# Patient Record
Sex: MALE | Race: WHITE | Employment: OTHER | URBAN - METROPOLITAN AREA
[De-identification: names, ages, dates, MRNs, and addresses within clinical notes are randomized per-mention and may not be internally consistent; named-entity substitution may affect disease eponyms.]

---

## 2024-02-03 ENCOUNTER — APPOINTMENT (OUTPATIENT)
Dept: MRI IMAGING | Age: 89
DRG: 439 | End: 2024-02-03
Attending: STUDENT IN AN ORGANIZED HEALTH CARE EDUCATION/TRAINING PROGRAM
Payer: MEDICARE

## 2024-02-03 ENCOUNTER — HOSPITAL ENCOUNTER (INPATIENT)
Age: 89
LOS: 4 days | Discharge: HOME | DRG: 439 | End: 2024-02-07
Attending: STUDENT IN AN ORGANIZED HEALTH CARE EDUCATION/TRAINING PROGRAM | Admitting: INTERNAL MEDICINE
Payer: MEDICARE

## 2024-02-03 ENCOUNTER — APPOINTMENT (OUTPATIENT)
Dept: GENERAL RADIOLOGY | Age: 89
DRG: 439 | End: 2024-02-03
Attending: STUDENT IN AN ORGANIZED HEALTH CARE EDUCATION/TRAINING PROGRAM
Payer: MEDICARE

## 2024-02-03 PROBLEM — R10.9 ABDOMINAL PAIN: Status: ACTIVE | Noted: 2024-02-03

## 2024-02-03 PROBLEM — K85.10 GALLSTONE PANCREATITIS: Status: ACTIVE | Noted: 2024-02-03

## 2024-02-03 LAB
ALBUMIN SERPL-MCNC: 3.4 G/DL (ref 3.4–5)
ALBUMIN/GLOB SERPL: 1.2 {RATIO} (ref 1.1–2.2)
ALP SERPL-CCNC: 169 U/L (ref 40–129)
ALT SERPL-CCNC: 319 U/L (ref 10–40)
ANION GAP SERPL CALCULATED.3IONS-SCNC: 15 MMOL/L (ref 3–16)
ANISOCYTOSIS BLD QL SMEAR: ABNORMAL
AST SERPL-CCNC: 257 U/L (ref 15–37)
BASOPHILS # BLD: 0 K/UL (ref 0–0.2)
BASOPHILS NFR BLD: 0 %
BILIRUB DIRECT SERPL-MCNC: 2.9 MG/DL (ref 0–0.3)
BILIRUB INDIRECT SERPL-MCNC: 0.4 MG/DL (ref 0–1)
BILIRUB SERPL-MCNC: 3.3 MG/DL (ref 0–1)
BUN SERPL-MCNC: 43 MG/DL (ref 7–20)
CALCIUM SERPL-MCNC: 8.8 MG/DL (ref 8.3–10.6)
CHLORIDE SERPL-SCNC: 104 MMOL/L (ref 99–110)
CO2 SERPL-SCNC: 19 MMOL/L (ref 21–32)
CREAT SERPL-MCNC: 1.7 MG/DL (ref 0.8–1.3)
DEPRECATED RDW RBC AUTO: 14 % (ref 12.4–15.4)
DOHLE BOD BLD QL SMEAR: PRESENT
EOSINOPHIL # BLD: 0 K/UL (ref 0–0.6)
EOSINOPHIL NFR BLD: 0 %
GFR SERPLBLD CREATININE-BSD FMLA CKD-EPI: 37 ML/MIN/{1.73_M2}
GLUCOSE BLD-MCNC: 169 MG/DL (ref 70–99)
GLUCOSE SERPL-MCNC: 222 MG/DL (ref 70–99)
HCT VFR BLD AUTO: 32.6 % (ref 40.5–52.5)
HGB BLD-MCNC: 10.7 G/DL (ref 13.5–17.5)
LACTATE BLDV-SCNC: 2.3 MMOL/L (ref 0.4–2)
LIPASE SERPL-CCNC: 1083 U/L (ref 13–60)
LYMPHOCYTES # BLD: 1.3 K/UL (ref 1–5.1)
LYMPHOCYTES NFR BLD: 6 %
MACROCYTES BLD QL SMEAR: ABNORMAL
MCH RBC QN AUTO: 29.2 PG (ref 26–34)
MCHC RBC AUTO-ENTMCNC: 32.9 G/DL (ref 31–36)
MCV RBC AUTO: 88.7 FL (ref 80–100)
MICROCYTES BLD QL SMEAR: ABNORMAL
MONOCYTES # BLD: 0.8 K/UL (ref 0–1.3)
MONOCYTES NFR BLD: 5 %
NEUTROPHILS # BLD: 14.6 K/UL (ref 1.7–7.7)
NEUTROPHILS NFR BLD: 55 %
NEUTS BAND NFR BLD MANUAL: 32 % (ref 0–7)
NEUTS VAC BLD QL SMEAR: PRESENT
PERFORMED ON: ABNORMAL
PLATELET # BLD AUTO: 162 K/UL (ref 135–450)
PLATELET BLD QL SMEAR: ADEQUATE
PMV BLD AUTO: 9.1 FL (ref 5–10.5)
POIKILOCYTOSIS BLD QL SMEAR: ABNORMAL
POLYCHROMASIA BLD QL SMEAR: ABNORMAL
POTASSIUM SERPL-SCNC: 4 MMOL/L (ref 3.5–5.1)
PROT SERPL-MCNC: 6.3 G/DL (ref 6.4–8.2)
RBC # BLD AUTO: 3.67 M/UL (ref 4.2–5.9)
SLIDE REVIEW: ABNORMAL
SODIUM SERPL-SCNC: 138 MMOL/L (ref 136–145)
VARIANT LYMPHS NFR BLD MANUAL: 2 % (ref 0–6)
WBC # BLD AUTO: 16.8 K/UL (ref 4–11)

## 2024-02-03 PROCEDURE — 83690 ASSAY OF LIPASE: CPT

## 2024-02-03 PROCEDURE — 1200000000 HC SEMI PRIVATE

## 2024-02-03 PROCEDURE — 2580000003 HC RX 258: Performed by: NURSE PRACTITIONER

## 2024-02-03 PROCEDURE — 80053 COMPREHEN METABOLIC PANEL: CPT

## 2024-02-03 PROCEDURE — 36415 COLL VENOUS BLD VENIPUNCTURE: CPT

## 2024-02-03 PROCEDURE — 85025 COMPLETE CBC W/AUTO DIFF WBC: CPT

## 2024-02-03 PROCEDURE — 74018 RADEX ABDOMEN 1 VIEW: CPT

## 2024-02-03 PROCEDURE — 6360000002 HC RX W HCPCS: Performed by: NURSE PRACTITIONER

## 2024-02-03 PROCEDURE — 83605 ASSAY OF LACTIC ACID: CPT

## 2024-02-03 PROCEDURE — 2580000003 HC RX 258: Performed by: INTERNAL MEDICINE

## 2024-02-03 PROCEDURE — 6360000002 HC RX W HCPCS: Performed by: INTERNAL MEDICINE

## 2024-02-03 PROCEDURE — 82248 BILIRUBIN DIRECT: CPT

## 2024-02-03 PROCEDURE — 74181 MRI ABDOMEN W/O CONTRAST: CPT

## 2024-02-03 RX ORDER — ATORVASTATIN CALCIUM 40 MG/1
40 TABLET, FILM COATED ORAL DAILY
COMMUNITY

## 2024-02-03 RX ORDER — SODIUM CHLORIDE 9 MG/ML
INJECTION, SOLUTION INTRAVENOUS PRN
Status: DISCONTINUED | OUTPATIENT
Start: 2024-02-03 | End: 2024-02-07 | Stop reason: HOSPADM

## 2024-02-03 RX ORDER — CLOPIDOGREL BISULFATE 75 MG/1
75 TABLET ORAL DAILY
Status: DISCONTINUED | OUTPATIENT
Start: 2024-02-03 | End: 2024-02-07 | Stop reason: HOSPADM

## 2024-02-03 RX ORDER — SODIUM CHLORIDE 9 MG/ML
INJECTION, SOLUTION INTRAVENOUS CONTINUOUS
Status: ACTIVE | OUTPATIENT
Start: 2024-02-03 | End: 2024-02-04

## 2024-02-03 RX ORDER — SODIUM CHLORIDE 0.9 % (FLUSH) 0.9 %
5-40 SYRINGE (ML) INJECTION PRN
Status: DISCONTINUED | OUTPATIENT
Start: 2024-02-03 | End: 2024-02-07 | Stop reason: HOSPADM

## 2024-02-03 RX ORDER — ONDANSETRON 2 MG/ML
4 INJECTION INTRAMUSCULAR; INTRAVENOUS EVERY 6 HOURS PRN
Status: DISCONTINUED | OUTPATIENT
Start: 2024-02-03 | End: 2024-02-07 | Stop reason: HOSPADM

## 2024-02-03 RX ORDER — NITROGLYCERIN 0.4 MG/1
0.4 TABLET SUBLINGUAL EVERY 5 MIN PRN
COMMUNITY

## 2024-02-03 RX ORDER — MAGNESIUM SULFATE IN WATER 40 MG/ML
2000 INJECTION, SOLUTION INTRAVENOUS PRN
Status: DISCONTINUED | OUTPATIENT
Start: 2024-02-03 | End: 2024-02-03

## 2024-02-03 RX ORDER — ACETAMINOPHEN 650 MG/1
650 SUPPOSITORY RECTAL EVERY 6 HOURS PRN
Status: DISCONTINUED | OUTPATIENT
Start: 2024-02-03 | End: 2024-02-07 | Stop reason: HOSPADM

## 2024-02-03 RX ORDER — ENOXAPARIN SODIUM 100 MG/ML
30 INJECTION SUBCUTANEOUS DAILY
Status: DISCONTINUED | OUTPATIENT
Start: 2024-02-03 | End: 2024-02-06

## 2024-02-03 RX ORDER — SODIUM CHLORIDE 0.9 % (FLUSH) 0.9 %
5-40 SYRINGE (ML) INJECTION EVERY 12 HOURS SCHEDULED
Status: DISCONTINUED | OUTPATIENT
Start: 2024-02-03 | End: 2024-02-07 | Stop reason: HOSPADM

## 2024-02-03 RX ORDER — CLOPIDOGREL BISULFATE 75 MG/1
75 TABLET ORAL DAILY
COMMUNITY

## 2024-02-03 RX ORDER — ONDANSETRON 4 MG/1
4 TABLET, ORALLY DISINTEGRATING ORAL EVERY 8 HOURS PRN
Status: DISCONTINUED | OUTPATIENT
Start: 2024-02-03 | End: 2024-02-07 | Stop reason: HOSPADM

## 2024-02-03 RX ORDER — SODIUM CHLORIDE, SODIUM LACTATE, POTASSIUM CHLORIDE, AND CALCIUM CHLORIDE .6; .31; .03; .02 G/100ML; G/100ML; G/100ML; G/100ML
500 INJECTION, SOLUTION INTRAVENOUS ONCE
Status: COMPLETED | OUTPATIENT
Start: 2024-02-03 | End: 2024-02-03

## 2024-02-03 RX ORDER — LEVOTHYROXINE SODIUM 0.05 MG/1
50 TABLET ORAL DAILY
COMMUNITY

## 2024-02-03 RX ORDER — ERGOCALCIFEROL 1.25 MG/1
50000 CAPSULE ORAL WEEKLY
COMMUNITY

## 2024-02-03 RX ORDER — ASPIRIN 81 MG/1
81 TABLET ORAL DAILY
Status: DISCONTINUED | OUTPATIENT
Start: 2024-02-03 | End: 2024-02-07 | Stop reason: HOSPADM

## 2024-02-03 RX ORDER — LANOLIN ALCOHOL/MO/W.PET/CERES
1000 CREAM (GRAM) TOPICAL DAILY
COMMUNITY

## 2024-02-03 RX ORDER — ACETAMINOPHEN 325 MG/1
650 TABLET ORAL EVERY 6 HOURS PRN
Status: DISCONTINUED | OUTPATIENT
Start: 2024-02-03 | End: 2024-02-07 | Stop reason: HOSPADM

## 2024-02-03 RX ORDER — MORPHINE SULFATE 2 MG/ML
1 INJECTION, SOLUTION INTRAMUSCULAR; INTRAVENOUS EVERY 4 HOURS PRN
Status: DISCONTINUED | OUTPATIENT
Start: 2024-02-03 | End: 2024-02-07 | Stop reason: HOSPADM

## 2024-02-03 RX ORDER — LISINOPRIL 10 MG/1
10 TABLET ORAL DAILY
COMMUNITY

## 2024-02-03 RX ORDER — HYDROCHLOROTHIAZIDE 25 MG/1
25 TABLET ORAL DAILY
COMMUNITY

## 2024-02-03 RX ORDER — POLYETHYLENE GLYCOL 3350 17 G/17G
17 POWDER, FOR SOLUTION ORAL DAILY PRN
Status: DISCONTINUED | OUTPATIENT
Start: 2024-02-03 | End: 2024-02-07 | Stop reason: HOSPADM

## 2024-02-03 RX ORDER — AMLODIPINE BESYLATE 10 MG/1
10 TABLET ORAL DAILY
COMMUNITY

## 2024-02-03 RX ORDER — SIMVASTATIN 20 MG
20 TABLET ORAL NIGHTLY
Status: ON HOLD | COMMUNITY
End: 2024-02-07 | Stop reason: HOSPADM

## 2024-02-03 RX ORDER — LEVOTHYROXINE SODIUM 0.05 MG/1
50 TABLET ORAL DAILY
Status: DISCONTINUED | OUTPATIENT
Start: 2024-02-04 | End: 2024-02-07 | Stop reason: HOSPADM

## 2024-02-03 RX ORDER — POTASSIUM CHLORIDE 20 MEQ/1
40 TABLET, EXTENDED RELEASE ORAL PRN
Status: DISCONTINUED | OUTPATIENT
Start: 2024-02-03 | End: 2024-02-03

## 2024-02-03 RX ORDER — POTASSIUM CHLORIDE 7.45 MG/ML
10 INJECTION INTRAVENOUS PRN
Status: DISCONTINUED | OUTPATIENT
Start: 2024-02-03 | End: 2024-02-03

## 2024-02-03 RX ADMIN — PIPERACILLIN AND TAZOBACTAM 3375 MG: 3; .375 INJECTION, POWDER, FOR SOLUTION INTRAVENOUS at 19:49

## 2024-02-03 RX ADMIN — SODIUM CHLORIDE, POTASSIUM CHLORIDE, SODIUM LACTATE AND CALCIUM CHLORIDE 500 ML: 600; 310; 30; 20 INJECTION, SOLUTION INTRAVENOUS at 16:28

## 2024-02-03 RX ADMIN — SODIUM CHLORIDE: 9 INJECTION, SOLUTION INTRAVENOUS at 14:08

## 2024-02-03 RX ADMIN — SODIUM CHLORIDE, PRESERVATIVE FREE 10 ML: 5 INJECTION INTRAVENOUS at 22:05

## 2024-02-03 RX ADMIN — ENOXAPARIN SODIUM 30 MG: 100 INJECTION SUBCUTANEOUS at 19:51

## 2024-02-03 ASSESSMENT — PAIN SCALES - GENERAL: PAINLEVEL_OUTOF10: 0

## 2024-02-03 NOTE — H&P
Hospital Medicine History & Physical      Date of Admission: 2/3/2024    Date of Service:  Pt seen/examined on 2/3/24     [x]Admitted to Inpatient with expected LOS greater than two midnights due to medical therapy.  []Placed in Observation status.    Chief Admission Complaint:  I thought I was dying    Presenting Admission History:      89 y.o. male with hx of cad, htn, hld, hypothyroid who presented to [facility] with abd pain. Pt developed sudden onset sharp stabbing abd pain yesterday after lunch(he ate gumbo soup).  Pain was 10/10 so came to [facility] for evaluation. He had assoc chills/rigors and nausea but no emesis.  He noted some diarrhea.    -Of note, he had 2 DAYRON placed by cards approx 3 weeks ago and is on asa/plavix.    -Workup was concerning for pancreatitits so pt was transferred to Health system(lack of bed availability).  -pain is 1/10 currently  -he doesn't smoke/drink    Assessment/Plan:      Current Principal Problem:  Gallstone pancreatitis    Abd pain- concern for possible gallstone pancreatitis, OSH CT a/p with contrast obtained(gb distended, cbd dilated to 1.3cm, mild intrahepatic biliary dilation, inflammatory change of fat around panc head/uncinate process, possible 1.1cm rt adrenal adenoma, 2mm left renal stone-nonobstructing, no sb obstruction, prominent left colonic diverticulosis, dense calcification of abd aorta/bilat renl artery origins, enlarged prostate)  -GI consulted, apprec recs  -MRCP ordered  -ivfs given  -kept NPO initially    HTN-stable  -Held norvasc and acei    Cad-with stent (DAYRON to prox LAD, s/p shockwave balloon lithotripsy, moderate RCA stenosis noted) placed around 1/9/24  -held statin  -continued asa/plavix    MISC:  Held home iron/nitro/vit d/b12    Hld- held statin    Thyroid- continued synthroid    Discussed management and the need for Hospitalization of the patient w/ the Emergency Department Provider: not applicable    CXR: I have reviewed the CXR with the

## 2024-02-03 NOTE — CONSULTS
Consult Call Back    Who:Jaylin Alston, JESSIE - CNP   Date:2/3/2024,  Time:2:33 PM    Electronically signed by Joseline Taylor on 2/3/24 at 2:33 PM EST

## 2024-02-03 NOTE — PROGRESS NOTES
4 Eyes Skin Assessment     The patient is being assess for  Admission    I agree that 2 RN's have performed a thorough Head to Toe Skin Assessment on the patient. ALL assessment sites listed below have been assessed.       Areas assessed by both nurses: Elissa EISENBERG, Naye RN  [x]   Head, Face, and Ears   [x]   Shoulders, Back, and Chest  [x]   Arms, Elbows, and Hands   [x]   Coccyx, Sacrum, and Ischum  [x]   Legs, Feet, and Heels    Scattered ecchymosis    Non healing wound from lesion removal upper/mid chest. 100% black/brown eschar.     Pinpoint abrasions x3 top of L foot.        Does the Patient have Skin Breakdown?  No         Jeremy Prevention initiated:  NA   Wound Care Orders initiated:  NA      WOC nurse consulted for Pressure Injury (Stage 3,4, Unstageable, DTI, NWPT, and Complex wounds):  yes\     Nurse 1 eSignature: Electronically signed by Elissa Hall RN on 2/3/24 at 1:31 PM EST    **SHARE this note so that the co-signing nurse is able to place an eSignature**    Nurse 2 eSignature: {Esignature:[id number]}

## 2024-02-03 NOTE — CONSULTS
CONSULTATION  Dexter Akers is a 89 y.o. male asked to see us in consultation by  Gricel Petit MD & Demar Snowden MD for evaluation of concern for gallstone pancreatitis.    Mr. Akers is a 89 year old male with past medical history of hypothyroidism, HTN and CAD s/p C with PCI 1/9.    He presented to [facility] ER with acute onset of abdominal pain and nausea that began last night after eating.   Abdominal pain was in his RUQ.  He denies fever.  No vomiting.    He was noted to have elevated LFTs in the ER - although labs were not sent over.  CT abdomen and pelvis notes distended gallbladder 5 cm in width, proximal CBD dilation to 1.3 cm with tapering near the pancreatic head, mild intrahepatic biliary dilation and inflammatory changes surrounding the pancreatic head and uncinate process.     CBC, CMP and lipase are in process.       Medications Prior to Admission: nitroGLYCERIN (NITROSTAT) 0.4 MG SL tablet, Place 1 tablet under the tongue every 5 minutes as needed for Chest pain up to max of 3 total doses. If no relief after 1 dose, call 911.  levothyroxine (SYNTHROID) 50 MCG tablet, Take 1 tablet by mouth Daily  amLODIPine (NORVASC) 10 MG tablet, Take 1 tablet by mouth daily  atorvastatin (LIPITOR) 40 MG tablet, Take 1 tablet by mouth daily  hydroCHLOROthiazide (HYDRODIURIL) 25 MG tablet, Take 1 tablet by mouth daily  lisinopril (PRINIVIL;ZESTRIL) 10 MG tablet, Take 1 tablet by mouth daily  simvastatin (ZOCOR) 20 MG tablet, Take 1 tablet by mouth nightly  vitamin B-12 (CYANOCOBALAMIN) 1000 MCG tablet, Take 1 tablet by mouth daily  clopidogrel (PLAVIX) 75 MG tablet, Take 1 tablet by mouth daily  vitamin D (ERGOCALCIFEROL) 1.25 MG (23038 UT) CAPS capsule, Take 1 capsule by mouth once a week  ferrous sulfate (PX IRON) 27 MG TABS, Take by mouth    Medication:   sodium chloride flush  5-40 mL IntraVENous 2

## 2024-02-03 NOTE — PROGRESS NOTES
Patient is unable to verify his home meds. Writer spoke with Chago ortiz, and he read all of the med labels. Patient is unable to verify what he currently takes.

## 2024-02-03 NOTE — CONSULTS
Consult Placed   Consul sent via perfect serve  Who: Jaylin Alston  Date:2/3/2024    Time:12:57PM     Electronically signed by Joseline Taylor on 2/3/2024 at 12:57 PM

## 2024-02-04 LAB
ALBUMIN SERPL-MCNC: 3.4 G/DL (ref 3.4–5)
ALP SERPL-CCNC: 180 U/L (ref 40–129)
ALT SERPL-CCNC: 219 U/L (ref 10–40)
ANION GAP SERPL CALCULATED.3IONS-SCNC: 16 MMOL/L (ref 3–16)
AST SERPL-CCNC: 143 U/L (ref 15–37)
BILIRUB DIRECT SERPL-MCNC: 3.1 MG/DL (ref 0–0.3)
BILIRUB INDIRECT SERPL-MCNC: 0.4 MG/DL (ref 0–1)
BILIRUB SERPL-MCNC: 3.5 MG/DL (ref 0–1)
BUN SERPL-MCNC: 43 MG/DL (ref 7–20)
CALCIUM SERPL-MCNC: 8.8 MG/DL (ref 8.3–10.6)
CHLORIDE SERPL-SCNC: 108 MMOL/L (ref 99–110)
CO2 SERPL-SCNC: 18 MMOL/L (ref 21–32)
CREAT SERPL-MCNC: 1.8 MG/DL (ref 0.8–1.3)
DEPRECATED RDW RBC AUTO: 14.3 % (ref 12.4–15.4)
GFR SERPLBLD CREATININE-BSD FMLA CKD-EPI: 34 ML/MIN/{1.73_M2}
GLUCOSE SERPL-MCNC: 172 MG/DL (ref 70–99)
HCT VFR BLD AUTO: 32.6 % (ref 40.5–52.5)
HGB BLD-MCNC: 10.6 G/DL (ref 13.5–17.5)
LIPASE SERPL-CCNC: 489 U/L (ref 13–60)
MCH RBC QN AUTO: 29.1 PG (ref 26–34)
MCHC RBC AUTO-ENTMCNC: 32.5 G/DL (ref 31–36)
MCV RBC AUTO: 89.6 FL (ref 80–100)
PLATELET # BLD AUTO: 140 K/UL (ref 135–450)
PMV BLD AUTO: 9.8 FL (ref 5–10.5)
POTASSIUM SERPL-SCNC: 3.7 MMOL/L (ref 3.5–5.1)
PROT SERPL-MCNC: 6.2 G/DL (ref 6.4–8.2)
RBC # BLD AUTO: 3.64 M/UL (ref 4.2–5.9)
SODIUM SERPL-SCNC: 142 MMOL/L (ref 136–145)
WBC # BLD AUTO: 14.5 K/UL (ref 4–11)

## 2024-02-04 PROCEDURE — 80076 HEPATIC FUNCTION PANEL: CPT

## 2024-02-04 PROCEDURE — 2580000003 HC RX 258: Performed by: INTERNAL MEDICINE

## 2024-02-04 PROCEDURE — 36415 COLL VENOUS BLD VENIPUNCTURE: CPT

## 2024-02-04 PROCEDURE — 80048 BASIC METABOLIC PNL TOTAL CA: CPT

## 2024-02-04 PROCEDURE — 6360000002 HC RX W HCPCS: Performed by: INTERNAL MEDICINE

## 2024-02-04 PROCEDURE — 2580000003 HC RX 258: Performed by: NURSE PRACTITIONER

## 2024-02-04 PROCEDURE — 85027 COMPLETE CBC AUTOMATED: CPT

## 2024-02-04 PROCEDURE — 6360000002 HC RX W HCPCS: Performed by: NURSE PRACTITIONER

## 2024-02-04 PROCEDURE — 1200000000 HC SEMI PRIVATE

## 2024-02-04 PROCEDURE — 83690 ASSAY OF LIPASE: CPT

## 2024-02-04 PROCEDURE — 6370000000 HC RX 637 (ALT 250 FOR IP): Performed by: INTERNAL MEDICINE

## 2024-02-04 RX ORDER — SODIUM CHLORIDE 9 MG/ML
INJECTION, SOLUTION INTRAVENOUS CONTINUOUS
Status: DISCONTINUED | OUTPATIENT
Start: 2024-02-04 | End: 2024-02-05

## 2024-02-04 RX ADMIN — CLOPIDOGREL BISULFATE 75 MG: 75 TABLET ORAL at 08:06

## 2024-02-04 RX ADMIN — SODIUM CHLORIDE: 9 INJECTION, SOLUTION INTRAVENOUS at 11:32

## 2024-02-04 RX ADMIN — ASPIRIN 81 MG: 81 TABLET, COATED ORAL at 08:06

## 2024-02-04 RX ADMIN — SODIUM CHLORIDE: 9 INJECTION, SOLUTION INTRAVENOUS at 00:36

## 2024-02-04 RX ADMIN — PIPERACILLIN AND TAZOBACTAM 3375 MG: 3; .375 INJECTION, POWDER, FOR SOLUTION INTRAVENOUS at 11:33

## 2024-02-04 RX ADMIN — PIPERACILLIN AND TAZOBACTAM 3375 MG: 3; .375 INJECTION, POWDER, FOR SOLUTION INTRAVENOUS at 04:25

## 2024-02-04 RX ADMIN — ENOXAPARIN SODIUM 30 MG: 100 INJECTION SUBCUTANEOUS at 08:03

## 2024-02-04 RX ADMIN — SODIUM CHLORIDE, PRESERVATIVE FREE 10 ML: 5 INJECTION INTRAVENOUS at 20:22

## 2024-02-04 RX ADMIN — PIPERACILLIN AND TAZOBACTAM 3375 MG: 3; .375 INJECTION, POWDER, FOR SOLUTION INTRAVENOUS at 20:22

## 2024-02-04 RX ADMIN — LEVOTHYROXINE SODIUM 50 MCG: 0.05 TABLET ORAL at 06:00

## 2024-02-04 RX ADMIN — SODIUM CHLORIDE: 9 INJECTION, SOLUTION INTRAVENOUS at 06:35

## 2024-02-04 RX ADMIN — SODIUM CHLORIDE, PRESERVATIVE FREE 10 ML: 5 INJECTION INTRAVENOUS at 08:06

## 2024-02-04 NOTE — PROGRESS NOTES
PROGRESS NOTE    HPI: Dexter Akers is a(n)89 y.o. male admitted for work-up and treatment for Gallstone pancreatitis [K85.10]  Abdominal pain [R10.9].     We are following for gallstone pancreatitis.    Subjective:     He has no pain today. He is hungry.   He is afebrile.        Objective:     I/O last 3 completed shifts:  In: 1555.8 [I.V.:992.1; IV Piggyback:563.6]  Out: -       /60   Pulse 62   Temp 98 °F (36.7 °C) (Oral)   Resp 18   Ht 1.727 m (5' 8\")   Wt 85.3 kg (188 lb)   SpO2 93%   BMI 28.59 kg/m²     Physical Exam:  HEENT: + icteric sclera, oropharyngeal membranes pink and moist.  Cor: RRR  Lungs: non-labored, no respiratory distress  Abdomen: soft, NT. No ascites.  No hepatomegaly or splenomegaly  Extremities: no edema  Neuro: alert and oriented x 3, no asterixis  Skin: + jaundice    Results:   Lab Results   Component Value Date     (H) 02/04/2024     (H) 02/04/2024    ALKPHOS 180 (H) 02/04/2024    BILIDIR 3.1 (H) 02/04/2024    PROT 6.2 (L) 02/04/2024    LABALBU 3.4 02/04/2024    LIPASE 489.0 (H) 02/04/2024     Lab Results   Component Value Date    WBC 14.5 (H) 02/04/2024    HGB 10.6 (L) 02/04/2024    HCT 32.6 (L) 02/04/2024    MCV 89.6 02/04/2024     02/04/2024     BUN/Cr/glu/ALT/AST/amyl/lip:  43/1.8/--/219/143/--/489.0 (02/04 0542)  MRI ABDOMEN WO CONTRAST MRCP    Result Date: 2/3/2024  1. Suspected changes of acute cholecystitis. 2. Mild biliary ductal dilatation without obstructing stone or mass seen. 3. 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 Recommend follow-up pancreas protocol MRI in 2 years.     XR ABDOMEN (KUB) (SINGLE AP VIEW)    Result Date: 2/3/2024  Nonobstructive bowel gas pattern.         Impression:  89 year old male with past medical history of hypothyroidism, HTN and CAD s/p [facility] with PCI 1/9 presenting with [facility] ER with abdominal pain and nausea.

## 2024-02-04 NOTE — PROGRESS NOTES
Hospital Medicine Progress Note      Date of Admission: 2/3/2024  Hospital Day: 2    Chief Admission Complaint:    I thought I was dying      Subjective:   feels improved, wants to eat, no overnight events    Presenting Admission History:         89 y.o. male with hx of cad, htn, hld, hypothyroid who presented to [facility] with abd pain. Pt developed sudden onset sharp stabbing abd pain yesterday after lunch(he ate gumbo soup).  Pain was 10/10 so came to [facility] for evaluation. He had assoc chills/rigors and nausea but no emesis.  He noted some diarrhea.    -Of note, he had 2 DAYRON placed by cards approx 3 weeks ago and is on asa/plavix.    -Workup was concerning for pancreatitits so pt was transferred to [facility](lack of bed availability).  -pain is 1/10 currently  -he doesn't smoke/drink    Assessment/Plan:      Current Principal Problem:  Gallstone pancreatitis      Abd pain- concern for possible gallstone pancreatitis, OSH CT a/p with contrast obtained(gb distended, cbd dilated to 1.3cm, mild intrahepatic biliary dilation, inflammatory change of fat around panc head/uncinate process, possible 1.1cm rt adrenal adenoma, 2mm left renal stone-nonobstructing, no sb obstruction, prominent left colonic diverticulosis, dense calcification of abd aorta/bilat renl artery origins, enlarged prostate)  -GI consulted, apprec recs  -MRCP done 2/3(noted possible acute cholecystitis, no stone/mass seen in biliary duct, IPMN of pan body measuring 7mm(low risk, f/u 2 years MRI with pancr. Protocol)  -ivfs given  -kept NPO initially   -IV zosyn started 2/3    HTN-stable  -Held norvasc and acei     Cad-with stent (DAYRON to prox LAD, s/p shockwave balloon lithotripsy, moderate RCA stenosis noted) placed around 1/9/24  -held statin  -continued asa/plavix     MISC:  Held home iron/nitro/vit d/b12     Hld- held statin     Thyroid- continued synthroid       Physical Exam Performed:      General appearance:  No apparent

## 2024-02-04 NOTE — PLAN OF CARE
Problem: ABCDS Injury Assessment  Goal: Absence of physical injury  Outcome: Progressing  Flowsheets  Taken 2/4/2024 0013 by Vickey Stern RN  Absence of Physical Injury: Implement safety measures based on patient assessment  Taken 2/3/2024 1106 by Elissa Hall RN  Absence of Physical Injury: Implement safety measures based on patient assessment     Problem: Safety - Adult  Goal: Free from fall injury  Outcome: Progressing  Flowsheets  Taken 2/4/2024 0013 by Vickey Stern RN  Free From Fall Injury: Instruct family/caregiver on patient safety  Taken 2/3/2024 1106 by Elissa Hall RN  Free From Fall Injury: Instruct family/caregiver on patient safety     Problem: Pain  Goal: Verbalizes/displays adequate comfort level or baseline comfort level  Outcome: Progressing  Flowsheets (Taken 2/4/2024 0013)  Verbalizes/displays adequate comfort level or baseline comfort level:   Encourage patient to monitor pain and request assistance   Assess pain using appropriate pain scale   Administer analgesics based on type and severity of pain and evaluate response   Implement non-pharmacological measures as appropriate and evaluate response

## 2024-02-04 NOTE — PROGRESS NOTES
Pt is A&Ox4. VSS. Shift assessment completed. Denies pain or nausea. Wants to eat. Call light within reach, bed in lowest position, wheels locked. Bed alarm on and audible.

## 2024-02-04 NOTE — PROGRESS NOTES
Left unit; en route for MRC.     1801: MRI called and needed to verify that the stent the patient has is a heart stent. Called Yanni Wilde and verified that patient had a cardiac stent placed. Updated Winter in MRI.     Dr. Snowden updated code status orders- see orders.

## 2024-02-05 ENCOUNTER — APPOINTMENT (OUTPATIENT)
Dept: ULTRASOUND IMAGING | Age: 89
DRG: 439 | End: 2024-02-05
Attending: STUDENT IN AN ORGANIZED HEALTH CARE EDUCATION/TRAINING PROGRAM
Payer: MEDICARE

## 2024-02-05 LAB
ALBUMIN SERPL-MCNC: 2.8 G/DL (ref 3.4–5)
ALP SERPL-CCNC: 166 U/L (ref 40–129)
ALT SERPL-CCNC: 133 U/L (ref 10–40)
ANION GAP SERPL CALCULATED.3IONS-SCNC: 14 MMOL/L (ref 3–16)
AST SERPL-CCNC: 79 U/L (ref 15–37)
BILIRUB DIRECT SERPL-MCNC: 1.4 MG/DL (ref 0–0.3)
BILIRUB INDIRECT SERPL-MCNC: 0.4 MG/DL (ref 0–1)
BILIRUB SERPL-MCNC: 1.8 MG/DL (ref 0–1)
BUN SERPL-MCNC: 41 MG/DL (ref 7–20)
CALCIUM SERPL-MCNC: 8.6 MG/DL (ref 8.3–10.6)
CHLORIDE SERPL-SCNC: 112 MMOL/L (ref 99–110)
CO2 SERPL-SCNC: 19 MMOL/L (ref 21–32)
CREAT SERPL-MCNC: 1.6 MG/DL (ref 0.8–1.3)
GFR SERPLBLD CREATININE-BSD FMLA CKD-EPI: 40 ML/MIN/{1.73_M2}
GLUCOSE SERPL-MCNC: 161 MG/DL (ref 70–99)
LIPASE SERPL-CCNC: 210 U/L (ref 13–60)
MAGNESIUM SERPL-MCNC: 1.6 MG/DL (ref 1.8–2.4)
PHOSPHATE SERPL-MCNC: 1.9 MG/DL (ref 2.5–4.9)
POTASSIUM SERPL-SCNC: 3.4 MMOL/L (ref 3.5–5.1)
PROT SERPL-MCNC: 5.7 G/DL (ref 6.4–8.2)
SODIUM SERPL-SCNC: 145 MMOL/L (ref 136–145)

## 2024-02-05 PROCEDURE — 83690 ASSAY OF LIPASE: CPT

## 2024-02-05 PROCEDURE — 2580000003 HC RX 258: Performed by: INTERNAL MEDICINE

## 2024-02-05 PROCEDURE — 80076 HEPATIC FUNCTION PANEL: CPT

## 2024-02-05 PROCEDURE — 2580000003 HC RX 258: Performed by: NURSE PRACTITIONER

## 2024-02-05 PROCEDURE — 83735 ASSAY OF MAGNESIUM: CPT

## 2024-02-05 PROCEDURE — 36415 COLL VENOUS BLD VENIPUNCTURE: CPT

## 2024-02-05 PROCEDURE — 2500000003 HC RX 250 WO HCPCS: Performed by: INTERNAL MEDICINE

## 2024-02-05 PROCEDURE — 80069 RENAL FUNCTION PANEL: CPT

## 2024-02-05 PROCEDURE — 76770 US EXAM ABDO BACK WALL COMP: CPT

## 2024-02-05 PROCEDURE — 6370000000 HC RX 637 (ALT 250 FOR IP): Performed by: INTERNAL MEDICINE

## 2024-02-05 PROCEDURE — 6360000002 HC RX W HCPCS: Performed by: NURSE PRACTITIONER

## 2024-02-05 PROCEDURE — 6360000002 HC RX W HCPCS: Performed by: INTERNAL MEDICINE

## 2024-02-05 PROCEDURE — 1200000000 HC SEMI PRIVATE

## 2024-02-05 RX ORDER — MAGNESIUM SULFATE IN WATER 40 MG/ML
2000 INJECTION, SOLUTION INTRAVENOUS ONCE
Status: COMPLETED | OUTPATIENT
Start: 2024-02-05 | End: 2024-02-05

## 2024-02-05 RX ADMIN — PIPERACILLIN AND TAZOBACTAM 3375 MG: 3; .375 INJECTION, POWDER, FOR SOLUTION INTRAVENOUS at 20:28

## 2024-02-05 RX ADMIN — CLOPIDOGREL BISULFATE 75 MG: 75 TABLET ORAL at 08:06

## 2024-02-05 RX ADMIN — SODIUM BICARBONATE: 84 INJECTION, SOLUTION INTRAVENOUS at 15:08

## 2024-02-05 RX ADMIN — ENOXAPARIN SODIUM 30 MG: 100 INJECTION SUBCUTANEOUS at 08:07

## 2024-02-05 RX ADMIN — SODIUM CHLORIDE, PRESERVATIVE FREE 10 ML: 5 INJECTION INTRAVENOUS at 08:07

## 2024-02-05 RX ADMIN — PIPERACILLIN AND TAZOBACTAM 3375 MG: 3; .375 INJECTION, POWDER, FOR SOLUTION INTRAVENOUS at 04:46

## 2024-02-05 RX ADMIN — MAGNESIUM SULFATE HEPTAHYDRATE 2000 MG: 40 INJECTION, SOLUTION INTRAVENOUS at 12:36

## 2024-02-05 RX ADMIN — POTASSIUM BICARBONATE 40 MEQ: 782 TABLET, EFFERVESCENT ORAL at 12:35

## 2024-02-05 RX ADMIN — PIPERACILLIN AND TAZOBACTAM 3375 MG: 3; .375 INJECTION, POWDER, FOR SOLUTION INTRAVENOUS at 12:13

## 2024-02-05 RX ADMIN — SODIUM CHLORIDE: 9 INJECTION, SOLUTION INTRAVENOUS at 00:45

## 2024-02-05 RX ADMIN — ASPIRIN 81 MG: 81 TABLET, COATED ORAL at 08:07

## 2024-02-05 RX ADMIN — LEVOTHYROXINE SODIUM 50 MCG: 0.05 TABLET ORAL at 06:22

## 2024-02-05 NOTE — PROGRESS NOTES
Data (any 2)  [x] Discussed current management and discharge planning options with Case Management.  [] Discussed management of the case with:    [] Telemetry personally reviewed and interpreted as documented above    [] Imaging personally reviewed and interpreted, includes:    [x] Data Review (any 3)  [] Collateral history obtained from:    [x] All available Consultant notes from yesterday/today were reviewed  [x] All current labs were reviewed and interpreted for clinical significance   [x] Appropriate follow-up labs were ordered    Medications:  Personally reviewed in detail in conjunction w/ labs as documented for evidence of drug toxicity.     Infusion Medications    sodium chloride       Scheduled Medications    sodium chloride flush  5-40 mL IntraVENous 2 times per day    enoxaparin  30 mg SubCUTAneous Daily    levothyroxine  50 mcg Oral Daily    piperacillin-tazobactam  3,375 mg IntraVENous Q8H    clopidogrel  75 mg Oral Daily    aspirin  81 mg Oral Daily     PRN Meds: sodium chloride flush, sodium chloride, ondansetron **OR** ondansetron, polyethylene glycol, acetaminophen **OR** acetaminophen, morphine     Labs:  Personally reviewed and interpreted for clinical significance.     Recent Labs     02/03/24  1316 02/04/24  0542   WBC 16.8* 14.5*   HGB 10.7* 10.6*   HCT 32.6* 32.6*    140     Recent Labs     02/03/24 1316 02/04/24  0542    142   K 4.0 3.7    108   CO2 19* 18*   BUN 43* 43*   CREATININE 1.7* 1.8*   CALCIUM 8.8 8.8     No results for input(s): \"PROBNP\", \"TROPHS\" in the last 72 hours.  No results for input(s): \"LABA1C\" in the last 72 hours.  Recent Labs     02/03/24  1316 02/04/24  0542 02/05/24  0543   * 143* 79*   * 219* 133*   BILIDIR 2.9* 3.1* 1.4*   BILITOT 3.3* 3.5* 1.8*   ALKPHOS 169* 180* 166*     Recent Labs     02/03/24 1316   LACTA 2.3*       Urine Cultures: No results found for: \"LABURIN\"  Blood Cultures: No results found for: \"BC\"  No results found

## 2024-02-05 NOTE — PLAN OF CARE
Problem: ABCDS Injury Assessment  Goal: Absence of physical injury  Outcome: Progressing  Flowsheets (Taken 2/4/2024 1943)  Absence of Physical Injury: Implement safety measures based on patient assessment     Problem: Safety - Adult  Goal: Free from fall injury  Outcome: Progressing     Problem: Pain  Goal: Verbalizes/displays adequate comfort level or baseline comfort level  Outcome: Progressing  Flowsheets (Taken 2/4/2024 1943)  Verbalizes/displays adequate comfort level or baseline comfort level:   Encourage patient to monitor pain and request assistance   Assess pain using appropriate pain scale   Administer analgesics based on type and severity of pain and evaluate response   Implement non-pharmacological measures as appropriate and evaluate response     Problem: Gastrointestinal - Adult  Goal: Minimal or absence of nausea and vomiting  Outcome: Progressing  Flowsheets (Taken 2/4/2024 1943)  Minimal or absence of nausea and vomiting:   Administer IV fluids as ordered to ensure adequate hydration   Provide nonpharmacologic comfort measures as appropriate   Administer ordered antiemetic medications as needed   Advance diet as tolerated, if ordered

## 2024-02-05 NOTE — PROGRESS NOTES
PROGRESS NOTE    HPI: Dexter Akers is a(n)89 y.o. male admitted for work-up and treatment for Gallstone pancreatitis [K85.10]  Abdominal pain [R10.9].     We are following for gallstone pancreatitis.    Subjective:     No abdominal pain, nausea, vomiting in 48 hours.   Remains afebrile.         Objective:     I/O last 3 completed shifts:  In: 2362.5 [P.O.:960; I.V.:1130.4; IV Piggyback:272.1]  Out: -       BP (!) 165/72   Pulse 64   Temp 97.6 °F (36.4 °C) (Oral)   Resp 18   Ht 1.727 m (5' 8\")   Wt 87.6 kg (193 lb 2 oz)   SpO2 95%   BMI 29.36 kg/m²     Physical Exam:  HEENT: + mild icteric sclera, oropharyngeal membranes pink and moist.  Cor: RRR  Lungs: non-labored, no respiratory distress  Abdomen: distended but soft, NT  Extremities: no edema  Neuro: alert and oriented x 3      Results:   Lab Results   Component Value Date     (H) 02/05/2024    AST 79 (H) 02/05/2024    ALKPHOS 166 (H) 02/05/2024    BILIDIR 1.4 (H) 02/05/2024    PROT 5.7 (L) 02/05/2024    LABALBU 2.8 (L) 02/05/2024    LIPASE 210.0 (H) 02/05/2024     Lab Results   Component Value Date    WBC 14.5 (H) 02/04/2024    HGB 10.6 (L) 02/04/2024    HCT 32.6 (L) 02/04/2024    MCV 89.6 02/04/2024     02/04/2024     BUN/Cr/glu/ALT/AST/amyl/lip:  41/1.6/--/133/79/--/210.0 (02/05 0543)  MRI ABDOMEN WO CONTRAST MRCP    Result Date: 2/3/2024  1. Suspected changes of acute cholecystitis. 2. Mild biliary ductal dilatation without obstructing stone or mass seen. 3. 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 Recommend follow-up pancreas protocol MRI in 2 years.     XR ABDOMEN (KUB) (SINGLE AP VIEW)    Result Date: 2/3/2024  Nonobstructive bowel gas pattern.         Impression:  89 year old male with past medical history of hypothyroidism, HTN and CAD s/p [facility] with PCI 1/9 presenting with [facility] ER with abdominal pain and nausea.     Obstructive jaundice, RUQ

## 2024-02-05 NOTE — PROGRESS NOTES
Pt is A&Ox4. VSS. Shift assessment completed. Tolerates clear liquid diet. No complaints of nausea. Denies pain. Call light within reach, bed in lowest position, wheels locked. Bed alarm on and audible.

## 2024-02-05 NOTE — CONSULTS
[facility] Contin[facility] Nurse  Consult Note       NAME:  Dexter Akers  MEDICAL RECORD NUMBER:  [id number]  AGE: 89 y.o.   GENDER: male  : 1929  TODAY'S DATE:  2024    Subjective; oh, this is an old area where she took a growth off.   Reason for WOCN Evaluation and Assessment: chest wound      Dexter Akers is a 89 y.o. male referred by:   [] Physician  [x] Nursing  [] Other:     Wound Identification:  Wound Type: non-healing surgical  Contributing Factors:     Wound History: [age] y.o. male with hx of cad, htn, hld, hypothyroid who presented to [facility] with abd pain. Pt developed sudden onset sharp stabbing abd pain yesterday after lunch(he ate gumbo soup).  Pain was 10/10 so came to [facility] for evaluation. He had assoc chills/rigors and nausea but no emesis.  He noted some diarrhea.   Current Wound Care Treatment:  JOSUE    Patient Goal of Care:  [x] Wound Healing  [] Odor Control  [] Palliative Care  [] Pain Control   [] Other:         PAST MEDICAL HISTORY        Diagnosis Date    CAD (coronary artery disease)     stent 2024(Dr. Huertas, [facility])    HLD (hyperlipidemia)     HTN (hypertension)     Hypothyroid        PAST SURGICAL HISTORY    No past surgical history on file.    FAMILY HISTORY    No family history on file.    SOCIAL HISTORY    Social History     Tobacco Use    Smoking status: Former     Types: Cigarettes    Smokeless tobacco: Never   Substance Use Topics    Alcohol use: Not Currently    Drug use: Never       ALLERGIES    No Known Allergies    MEDICATIONS    No current facility-administered medications on file prior to encounter.     Current Outpatient Medications on File Prior to Encounter   Medication Sig Dispense Refill    nitroGLYCERIN (NITROSTAT) 0.4 MG SL tablet Place 1 tablet under the tongue every 5 minutes as needed for Chest pain up to max of 3 total doses. If no relief after 1 dose, call 911.      levothyroxine (SYNTHROID) 50 MCG  (cm) 0 cm 02/05/24 1740   Tunneling Position ___ O'Clock 0 02/05/24 1740   Undermining Starts ___ O'Clock 0 02/05/24 1740   Undermining Ends___ O'Clock 0 02/05/24 1740   Undermining Maxium Distance (cm) 0 02/05/24 1740   Wound Assessment Eschar dry 02/05/24 1740   Drainage Amount None (dry) 02/05/24 1740   Odor None 02/05/24 1740   Christine-wound Assessment Fragile;Intact 02/05/24 1740   Margins Attached edges 02/05/24 1740   Wound Thickness Description not for Pressure Injury Partial thickness 02/05/24 1740   Number of days: 2            Response to treatment:  Well tolerated by patient.     Pain Assessment:  Severity:  0 / 10  Quality of pain: N/A  Wound Pain Timing/Severity: none  Premedicated: No    Plan   Plan of Care: Wound 02/03/24 Sternum Upper-Dressing/Treatment: Betadine swabs/povidone iodine    Daily cleanse upper mid left chest scab with normal saline, pat dry.  Paint with Betadine swab.  JOSUE  Wound Care to sign off  Monitor for drainage, order, edema or signs and symptoms of infection.  Call wound care for deterioration [phone] or call [phone] and leave message.      Specialty Bed Required : No   [] Low Air Loss   [x] Pressure Redistribution  [] Fluid Immersion  [] Bariatric  [] Total Pressure Relief  [] Other:     Current Diet: ADULT DIET; Regular  Dietician consult:  Yes    Discharge Plan:  Placement for patient upon discharge: home with support    Patient appropriate for Outpatient Wound Care Center: No    Referrals:  [x]  / discharge planner following  [] Home Health Care  [] Supplies  [] Other    Patient/Caregiver Teaching:Patient states the scab comes off every now and then.  Old cancerous area that was removed back in 2019  Level of patient/caregiver understanding able to:   [] Indicates understanding       [] Needs reinforcement  [] Unsuccessful      [x] Verbal Understanding  [] Demonstrated understanding       [] No evidence of learning  [] Refused teaching         [] N/A

## 2024-02-05 NOTE — CONSULTS
Patient seen and examined, consult note dictated.    Assessment and Plan:    1- Elevated creatinine: Unclear if BUTCH versus A/CKD in the setting of volume depletion and recent IV contrast exposure. His urinary output is well maintained and his serum creatinine is slowly trending down.  - Check renal ultrasound.  - Continue volume expansion.  - Avoid all nephrotoxic agents at this time.  - Maintain systolic blood pressure > 120 mmHg.    2- Hypokalemia: PRN potassium replacement.    3- HTN: Blood pressure within acceptable range.    4- Anemia: Stable hemoglobin, monitor.    5- Abdominal pain: Management per primary team.

## 2024-02-05 NOTE — PROGRESS NOTES
Received patient in bed, alert oriented x4, VSS. Shift assessment done as charted. Clear fluid diet tolerated. Denies need as this time. Non skid foot ware on.  Kept bed in lowest position, call light and bedside table within reach. Instructed to call out for assistance.

## 2024-02-05 NOTE — CONSULTS
Consult Placed   Consult sent through perfect wayne   Who: Augustin  Date: 2/5/2024   Time: 14:11     Electronically signed by Karissa Luke on 2/5/2024 at 2:11 PM

## 2024-02-06 ENCOUNTER — APPOINTMENT (OUTPATIENT)
Dept: GENERAL RADIOLOGY | Age: 89
DRG: 439 | End: 2024-02-06
Attending: STUDENT IN AN ORGANIZED HEALTH CARE EDUCATION/TRAINING PROGRAM
Payer: MEDICARE

## 2024-02-06 LAB
ALBUMIN SERPL-MCNC: 2.9 G/DL (ref 3.4–5)
ALP SERPL-CCNC: 195 U/L (ref 40–129)
ALT SERPL-CCNC: 85 U/L (ref 10–40)
ANION GAP SERPL CALCULATED.3IONS-SCNC: 9 MMOL/L (ref 3–16)
AST SERPL-CCNC: 32 U/L (ref 15–37)
BILIRUB DIRECT SERPL-MCNC: 0.9 MG/DL (ref 0–0.3)
BILIRUB INDIRECT SERPL-MCNC: 0.5 MG/DL (ref 0–1)
BILIRUB SERPL-MCNC: 1.4 MG/DL (ref 0–1)
BUN SERPL-MCNC: 26 MG/DL (ref 7–20)
CALCIUM SERPL-MCNC: 8.6 MG/DL (ref 8.3–10.6)
CHLORIDE SERPL-SCNC: 105 MMOL/L (ref 99–110)
CO2 SERPL-SCNC: 25 MMOL/L (ref 21–32)
CREAT SERPL-MCNC: 1.2 MG/DL (ref 0.8–1.3)
DEPRECATED RDW RBC AUTO: 13.9 % (ref 12.4–15.4)
GFR SERPLBLD CREATININE-BSD FMLA CKD-EPI: 56 ML/MIN/{1.73_M2}
GLUCOSE SERPL-MCNC: 204 MG/DL (ref 70–99)
HCT VFR BLD AUTO: 27.3 % (ref 40.5–52.5)
HGB BLD-MCNC: 9.2 G/DL (ref 13.5–17.5)
MAGNESIUM SERPL-MCNC: 1.6 MG/DL (ref 1.8–2.4)
MCH RBC QN AUTO: 29.6 PG (ref 26–34)
MCHC RBC AUTO-ENTMCNC: 33.6 G/DL (ref 31–36)
MCV RBC AUTO: 88.2 FL (ref 80–100)
PHOSPHATE SERPL-MCNC: 1.6 MG/DL (ref 2.5–4.9)
PLATELET # BLD AUTO: 129 K/UL (ref 135–450)
PMV BLD AUTO: 9.6 FL (ref 5–10.5)
POTASSIUM SERPL-SCNC: 3.2 MMOL/L (ref 3.5–5.1)
PROT SERPL-MCNC: 5.6 G/DL (ref 6.4–8.2)
RBC # BLD AUTO: 3.1 M/UL (ref 4.2–5.9)
SODIUM SERPL-SCNC: 139 MMOL/L (ref 136–145)
WBC # BLD AUTO: 9 K/UL (ref 4–11)

## 2024-02-06 PROCEDURE — 6360000002 HC RX W HCPCS: Performed by: NURSE PRACTITIONER

## 2024-02-06 PROCEDURE — 2580000003 HC RX 258: Performed by: INTERNAL MEDICINE

## 2024-02-06 PROCEDURE — 2580000003 HC RX 258: Performed by: NURSE PRACTITIONER

## 2024-02-06 PROCEDURE — 6360000002 HC RX W HCPCS: Performed by: INTERNAL MEDICINE

## 2024-02-06 PROCEDURE — 83735 ASSAY OF MAGNESIUM: CPT

## 2024-02-06 PROCEDURE — 2500000003 HC RX 250 WO HCPCS: Performed by: INTERNAL MEDICINE

## 2024-02-06 PROCEDURE — 6370000000 HC RX 637 (ALT 250 FOR IP): Performed by: INTERNAL MEDICINE

## 2024-02-06 PROCEDURE — 85027 COMPLETE CBC AUTOMATED: CPT

## 2024-02-06 PROCEDURE — 80069 RENAL FUNCTION PANEL: CPT

## 2024-02-06 PROCEDURE — 80076 HEPATIC FUNCTION PANEL: CPT

## 2024-02-06 PROCEDURE — 1200000000 HC SEMI PRIVATE

## 2024-02-06 PROCEDURE — 74018 RADEX ABDOMEN 1 VIEW: CPT

## 2024-02-06 PROCEDURE — 36415 COLL VENOUS BLD VENIPUNCTURE: CPT

## 2024-02-06 RX ORDER — DOCUSATE SODIUM 100 MG/1
100 CAPSULE, LIQUID FILLED ORAL DAILY
Status: DISCONTINUED | OUTPATIENT
Start: 2024-02-06 | End: 2024-02-07 | Stop reason: HOSPADM

## 2024-02-06 RX ORDER — SODIUM CHLORIDE 9 MG/ML
INJECTION, SOLUTION INTRAVENOUS CONTINUOUS
Status: DISCONTINUED | OUTPATIENT
Start: 2024-02-06 | End: 2024-02-07

## 2024-02-06 RX ORDER — MAGNESIUM SULFATE IN WATER 40 MG/ML
2000 INJECTION, SOLUTION INTRAVENOUS ONCE
Status: COMPLETED | OUTPATIENT
Start: 2024-02-06 | End: 2024-02-06

## 2024-02-06 RX ORDER — ENOXAPARIN SODIUM 100 MG/ML
40 INJECTION SUBCUTANEOUS DAILY
Status: DISCONTINUED | OUTPATIENT
Start: 2024-02-07 | End: 2024-02-07 | Stop reason: HOSPADM

## 2024-02-06 RX ORDER — BISACODYL 10 MG
10 SUPPOSITORY, RECTAL RECTAL DAILY PRN
Status: DISCONTINUED | OUTPATIENT
Start: 2024-02-06 | End: 2024-02-07 | Stop reason: HOSPADM

## 2024-02-06 RX ADMIN — BISACODYL 10 MG: 10 SUPPOSITORY RECTAL at 18:58

## 2024-02-06 RX ADMIN — PIPERACILLIN AND TAZOBACTAM 3375 MG: 3; .375 INJECTION, POWDER, FOR SOLUTION INTRAVENOUS at 20:24

## 2024-02-06 RX ADMIN — ENOXAPARIN SODIUM 30 MG: 100 INJECTION SUBCUTANEOUS at 10:08

## 2024-02-06 RX ADMIN — PIPERACILLIN AND TAZOBACTAM 3375 MG: 3; .375 INJECTION, POWDER, FOR SOLUTION INTRAVENOUS at 03:38

## 2024-02-06 RX ADMIN — LEVOTHYROXINE SODIUM 50 MCG: 0.05 TABLET ORAL at 10:08

## 2024-02-06 RX ADMIN — MAGNESIUM SULFATE HEPTAHYDRATE 2000 MG: 40 INJECTION, SOLUTION INTRAVENOUS at 17:18

## 2024-02-06 RX ADMIN — ASPIRIN 81 MG: 81 TABLET, COATED ORAL at 10:08

## 2024-02-06 RX ADMIN — POTASSIUM BICARBONATE 40 MEQ: 782 TABLET, EFFERVESCENT ORAL at 13:28

## 2024-02-06 RX ADMIN — PIPERACILLIN AND TAZOBACTAM 3375 MG: 3; .375 INJECTION, POWDER, FOR SOLUTION INTRAVENOUS at 13:28

## 2024-02-06 RX ADMIN — CLOPIDOGREL BISULFATE 75 MG: 75 TABLET ORAL at 10:08

## 2024-02-06 RX ADMIN — SODIUM CHLORIDE: 9 INJECTION, SOLUTION INTRAVENOUS at 13:25

## 2024-02-06 RX ADMIN — SODIUM BICARBONATE: 84 INJECTION, SOLUTION INTRAVENOUS at 05:53

## 2024-02-06 RX ADMIN — DOCUSATE SODIUM 100 MG: 100 CAPSULE, LIQUID FILLED ORAL at 18:57

## 2024-02-06 RX ADMIN — SODIUM CHLORIDE, PRESERVATIVE FREE 10 ML: 5 INJECTION INTRAVENOUS at 13:30

## 2024-02-06 RX ADMIN — POLYETHYLENE GLYCOL 3350 17 G: 17 POWDER, FOR SOLUTION ORAL at 10:08

## 2024-02-06 ASSESSMENT — PAIN SCALES - GENERAL
PAINLEVEL_OUTOF10: 0
PAINLEVEL_OUTOF10: 0

## 2024-02-06 NOTE — PROGRESS NOTES
Pt alert and orientated. Resting in bed. Call light within reach and bed alarm on and working.Rissa Hernandez RN

## 2024-02-06 NOTE — PROGRESS NOTES
A&Ox4. No complaints of /SOB, chest pain or heaviness. No cyanosis or pallor. No abdominal pain upon rounds. Able to ambulate with x1 walker to the bathroom.

## 2024-02-06 NOTE — PROGRESS NOTES
Department of Internal Medicine  Nephrology Progress Note        SUBJECTIVE:    We are following this patient for BUTCH.  The patient was seen and examined; he feels well today with no CP, SOB, nausea or vomiting.    ROS: No fever or chills.  Social: No family at bedside.    Physical Exam:    VITALS:  BP (!) 159/64   Pulse 78   Temp 97.7 °F (36.5 °C) (Oral)   Resp 20   Ht 1.727 m (5' 8\")   Wt 88.8 kg (195 lb 12.3 oz)   SpO2 98%   BMI 29.77 kg/m²     General appearance: Seems comfortable, no acute distress.  Neck: Trachea midline, thyroid normal.   Lungs:  Non labored breathing, CTA to anterior auscultation.  Heart:  S1S2 normal, rub or gallop. No peripheral edema.  Abdomen: Soft, non-tender, no organomegaly.   Skin: No lesions or rashes, warm to touch.     DATA:    CBC with Differential:    Lab Results   Component Value Date/Time    WBC 14.5 02/04/2024 05:42 AM    RBC 3.64 02/04/2024 05:42 AM    HGB 10.6 02/04/2024 05:42 AM    HCT 32.6 02/04/2024 05:42 AM     02/04/2024 05:42 AM    MCV 89.6 02/04/2024 05:42 AM    MCH 29.1 02/04/2024 05:42 AM    MCHC 32.5 02/04/2024 05:42 AM    RDW 14.3 02/04/2024 05:42 AM    BANDSPCT 32 02/03/2024 01:16 PM    LYMPHOPCT 6.0 02/03/2024 01:16 PM    MONOPCT 5.0 02/03/2024 01:16 PM    BASOPCT 0.0 02/03/2024 01:16 PM    MONOSABS 0.8 02/03/2024 01:16 PM    LYMPHSABS 1.3 02/03/2024 01:16 PM    EOSABS 0.0 02/03/2024 01:16 PM    BASOSABS 0.0 02/03/2024 01:16 PM     BMP:    Lab Results   Component Value Date/Time     02/06/2024 06:26 AM    K 3.2 02/06/2024 06:26 AM    K 3.7 02/04/2024 05:42 AM     02/06/2024 06:26 AM    CO2 25 02/06/2024 06:26 AM    BUN 26 02/06/2024 06:26 AM    LABALBU 2.9 02/06/2024 06:26 AM    CREATININE 1.2 02/06/2024 06:26 AM    CALCIUM 8.6 02/06/2024 06:26 AM    LABGLOM 56 02/06/2024 06:26 AM    GLUCOSE 204 02/06/2024 06:26 AM       IMPRESSION/RECOMMENDATIONS:      1- BUTCH: Likely secondary to a pre-renal component along with recent IV contrast

## 2024-02-06 NOTE — PROGRESS NOTES
PROGRESS NOTE    HPI: Dexter Akers is a(n)89 y.o. male admitted for work-up and treatment for Gallstone pancreatitis [K85.10]  Abdominal pain [R10.9].     We are following for gallstone pancreatitis.    Subjective:     He continues to remain asymptomatic. Tolerating a diet.       Objective:     I/O last 3 completed shifts:  In: 1953.9 [P.O.:1400; I.V.:427.5; IV Piggyback:126.5]  Out: -       BP (!) 159/64   Pulse 78   Temp 97.7 °F (36.5 °C) (Oral)   Resp 20   Ht 1.727 m (5' 8\")   Wt 88.8 kg (195 lb 12.3 oz)   SpO2 98%   BMI 29.77 kg/m²     Physical Exam:  HEENT: non icteric sclera, oropharyngeal membranes pink and moist.  Cor: RRR  Lungs: non-labored, no respiratory distress  Abdomen: distended but soft, NT  Extremities: no edema  Neuro: alert and oriented x 3      Results:   Lab Results   Component Value Date    ALT 85 (H) 02/06/2024    AST 32 02/06/2024    ALKPHOS 195 (H) 02/06/2024    BILIDIR 0.9 (H) 02/06/2024    PROT 5.6 (L) 02/06/2024    LABALBU 2.9 (L) 02/06/2024    LIPASE 210.0 (H) 02/05/2024     Lab Results   Component Value Date    WBC 9.0 02/06/2024    HGB 9.2 (L) 02/06/2024    HCT 27.3 (L) 02/06/2024    MCV 88.2 02/06/2024     (L) 02/06/2024     BUN/Cr/glu/ALT/AST/amyl/lip:  26/1.2/--/85/32/--/-- (02/06 0626)  MRI ABDOMEN WO CONTRAST MRCP    Result Date: 2/3/2024  1. Suspected changes of acute cholecystitis. 2. Mild biliary ductal dilatation without obstructing stone or mass seen. 3. 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 Recommend follow-up pancreas protocol MRI in 2 years.     XR ABDOMEN (KUB) (SINGLE AP VIEW)    Result Date: 2/3/2024  Nonobstructive bowel gas pattern.         Impression:  89 year old male with past medical history of hypothyroidism, HTN and CAD s/p LHC with PCI 1/9 presenting with [facility] ER with abdominal pain and nausea.     Obstructive jaundice, RUQ pain. Likely gallstone

## 2024-02-06 NOTE — PROGRESS NOTES
-renal u/s unremarkable    MISC:  Held home iron/nitro/vit d/b12     Hld- held statin     Thyroid- continued synthroid        Physical Exam Performed:       General appearance:  No apparent distress  Respiratory:  Normal respiratory effort.   Cardiovascular:  Regular rate and rhythm.  Abdomen:  Soft, non-tender, distended. +tympanic bs  Musculoskeletal:  No edema  Neurologic:  Non-focal  Psychiatric:  Alert and oriented    BP (!) 144/70   Pulse 69   Temp 98.2 °F (36.8 °C)   Resp 20   Ht 1.727 m (5' 8\")   Wt 88.8 kg (195 lb 12.3 oz)   SpO2 96%   BMI 29.77 kg/m²     Diet: ADULT DIET; Regular; Low Fat/Low Chol/High Fiber/ARGELIA  DVT Prophylaxis: [x]PPx LMWH  []SQ Heparin  []IPC/SCDs  []Eliquis  []Xarelto  []Coumadin  []Other -      Code status: Limited  PT/OT Eval Status:   [x]NOT yet ordered  []Ordered and Pending   []Seen with Recommendations for:  []Home independently  []Home w/ assist  []HHC  []SNF  []Acute Rehab    Anticipated Discharge Day/Date:  Pending workup, gi/nephro recs, having BM , stable lytes, check kub, ?tomorrow    Anticipated Discharge Location: [x]Home  []HHC  []SNF  []Acute Rehab  []ECF  []LTAC  []Hospice  []Other -      Consults:      IP CONSULT TO GI  IP CONSULT TO NEPHROLOGY      This patient has a high likelihood of being discharged tomorrow and is appropriate for A1 Discharge Unit in AM pending clinical course overnight: []Yes  [x]No    ------------------------------------------------------------------------------------------------------------------------------------------------------------------------    MDM      [x] High (any 2)    A. Problems (any 1)  [x] Acute/Chronic Illness/injury posing threat to life or bodily function:    [] Severe exacerbation of chronic illness:    ---------------------------------------------------------------------  B. Risk of Treatment (any 1)   [] Drugs/treatments that require intensive monitoring for toxicity include:    [] Change in code status:    []  Decision to escalate care:    [] Major surgery/procedure with associated risk factors:    ----------------------------------------------------------------------  C. Data (any 2)  [x] Discussed current management and discharge planning options with Case Management.  [] Discussed management of the case with:    [] Telemetry personally reviewed and interpreted as documented above    [] Imaging personally reviewed and interpreted, includes:    [x] Data Review (any 3)  [] Collateral history obtained from:    [x] All available Consultant notes from yesterday/today were reviewed  [x] All current labs were reviewed and interpreted for clinical significance   [x] Appropriate follow-up labs were ordered    Medications:  Personally reviewed in detail in conjunction w/ labs as documented for evidence of drug toxicity.     Infusion Medications    sodium chloride 50 mL/hr at 02/06/24 1325    sodium chloride       Scheduled Medications    [START ON 2/7/2024] enoxaparin  40 mg SubCUTAneous Daily    magnesium sulfate  2,000 mg IntraVENous Once    sodium chloride flush  5-40 mL IntraVENous 2 times per day    levothyroxine  50 mcg Oral Daily    piperacillin-tazobactam  3,375 mg IntraVENous Q8H    clopidogrel  75 mg Oral Daily    aspirin  81 mg Oral Daily     PRN Meds: sodium chloride flush, sodium chloride, ondansetron **OR** ondansetron, polyethylene glycol, acetaminophen **OR** acetaminophen, morphine     Labs:  Personally reviewed and interpreted for clinical significance.     Recent Labs     02/04/24  0542 02/06/24 0626   WBC 14.5* 9.0   HGB 10.6* 9.2*   HCT 32.6* 27.3*    129*     Recent Labs     02/04/24  0542 02/05/24  0543 02/06/24  0626    145 139   K 3.7 3.4* 3.2*    112* 105   CO2 18* 19* 25   BUN 43* 41* 26*   CREATININE 1.8* 1.6* 1.2   CALCIUM 8.8 8.6 8.6   MG  --  1.60* 1.60*   PHOS  --  1.9* 1.6*     No results for input(s): \"PROBNP\", \"TROPHS\" in the last 72 hours.  No results for input(s): \"LABA1C\"

## 2024-02-06 NOTE — CONSULTS
[address] [address]                                  CONSULTATION    PATIENT NAME: CHRISTINA HE                      :        1929  MED REC NO:   [id number]                          ROOM:       0332  ACCOUNT NO:   [id number]                           ADMIT DATE: 2024  PROVIDER:     Miguel Ruffin MD    CONSULT DATE:  2024    REASON FOR CONSULTATION:  Elevated creatinine.    HISTORY OF PRESENT ILLNESS:  The patient is an 89-year-old   male patient who presented to [facility] complaining of  severe acute onset abdominal pain.  Upon presentation, he was noted to  have an elevated creatinine of 1.7 to 1.8 mg/dL, which prompted  Nephrology consultation.    PAST MEDICAL HISTORY:  1.  Hypertension.  2.  Coronary artery disease.  3.  Hyperlipidemia.  4.  Hypothyroidism.    PAST SURGICAL HISTORY:  Coronary angiogram.    ALLERGIES:  The patient has no known drug allergies.    SOCIAL HISTORY:  The patient does not smoke or drink alcohol.    FAMILY HISTORY:  Negative for kidney disease.    REVIEW OF SYSTEMS:  The patient denied any fevers, chills, cough or  expectorations.  Otherwise, a 10-point review of systems was relatively  unremarkable.    PHYSICAL EXAMINATION:  VITAL SIGNS:  Blood pressure 136/77, heart rate 62, respirations 18,  temperature 97.9 Fahrenheit.  The patient is satting 96% on room air.  GENERAL APPEARANCE:  The patient is alert and oriented x3, not in acute  distress.  HEENT:  Eyes revealed normal conjunctivae and reactive pupils.  NECK:  Revealed midline trachea and nonpalpable thyroid.  LUNGS:  Clear to anterior auscultation bilaterally, nonlabored  breathing.  CARDIOVASCULAR EXAM:  Revealed S1 and S2, regular rate and rhythm.  No  added murmurs or rubs.  No peripheral edema.  ABDOMINAL EXAM:   Revealed soft abdomen.  No organomegaly.  SKIN:  Revealed no lesions or rashes.

## 2024-02-07 VITALS
WEIGHT: 194.9 LBS | SYSTOLIC BLOOD PRESSURE: 147 MMHG | DIASTOLIC BLOOD PRESSURE: 56 MMHG | BODY MASS INDEX: 29.54 KG/M2 | HEART RATE: 66 BPM | OXYGEN SATURATION: 95 % | RESPIRATION RATE: 18 BRPM | HEIGHT: 68 IN | TEMPERATURE: 98 F

## 2024-02-07 LAB
ALBUMIN SERPL-MCNC: 3 G/DL (ref 3.4–5)
ALP SERPL-CCNC: 247 U/L (ref 40–129)
ALT SERPL-CCNC: 65 U/L (ref 10–40)
ANION GAP SERPL CALCULATED.3IONS-SCNC: 9 MMOL/L (ref 3–16)
AST SERPL-CCNC: 22 U/L (ref 15–37)
BILIRUB DIRECT SERPL-MCNC: 0.8 MG/DL (ref 0–0.3)
BILIRUB INDIRECT SERPL-MCNC: 0.6 MG/DL (ref 0–1)
BILIRUB SERPL-MCNC: 1.4 MG/DL (ref 0–1)
BUN SERPL-MCNC: 19 MG/DL (ref 7–20)
CALCIUM SERPL-MCNC: 8.9 MG/DL (ref 8.3–10.6)
CHLORIDE SERPL-SCNC: 103 MMOL/L (ref 99–110)
CO2 SERPL-SCNC: 27 MMOL/L (ref 21–32)
CREAT SERPL-MCNC: 1.2 MG/DL (ref 0.8–1.3)
DEPRECATED RDW RBC AUTO: 13.9 % (ref 12.4–15.4)
GFR SERPLBLD CREATININE-BSD FMLA CKD-EPI: 56 ML/MIN/{1.73_M2}
GLUCOSE SERPL-MCNC: 146 MG/DL (ref 70–99)
HCT VFR BLD AUTO: 30.9 % (ref 40.5–52.5)
HGB BLD-MCNC: 10.2 G/DL (ref 13.5–17.5)
MAGNESIUM SERPL-MCNC: 1.7 MG/DL (ref 1.8–2.4)
MCH RBC QN AUTO: 29.5 PG (ref 26–34)
MCHC RBC AUTO-ENTMCNC: 33.1 G/DL (ref 31–36)
MCV RBC AUTO: 89 FL (ref 80–100)
PHOSPHATE SERPL-MCNC: 2.2 MG/DL (ref 2.5–4.9)
PLATELET # BLD AUTO: 161 K/UL (ref 135–450)
PMV BLD AUTO: 9.4 FL (ref 5–10.5)
POTASSIUM SERPL-SCNC: 4.1 MMOL/L (ref 3.5–5.1)
PROT SERPL-MCNC: 6.2 G/DL (ref 6.4–8.2)
RBC # BLD AUTO: 3.48 M/UL (ref 4.2–5.9)
SODIUM SERPL-SCNC: 139 MMOL/L (ref 136–145)
WBC # BLD AUTO: 7.9 K/UL (ref 4–11)

## 2024-02-07 PROCEDURE — 36415 COLL VENOUS BLD VENIPUNCTURE: CPT

## 2024-02-07 PROCEDURE — 83735 ASSAY OF MAGNESIUM: CPT

## 2024-02-07 PROCEDURE — 85027 COMPLETE CBC AUTOMATED: CPT

## 2024-02-07 PROCEDURE — 80076 HEPATIC FUNCTION PANEL: CPT

## 2024-02-07 PROCEDURE — 2580000003 HC RX 258: Performed by: NURSE PRACTITIONER

## 2024-02-07 PROCEDURE — 6360000002 HC RX W HCPCS: Performed by: NURSE PRACTITIONER

## 2024-02-07 PROCEDURE — 6370000000 HC RX 637 (ALT 250 FOR IP): Performed by: INTERNAL MEDICINE

## 2024-02-07 PROCEDURE — 80069 RENAL FUNCTION PANEL: CPT

## 2024-02-07 PROCEDURE — 6360000002 HC RX W HCPCS: Performed by: INTERNAL MEDICINE

## 2024-02-07 RX ORDER — PSEUDOEPHEDRINE HCL 30 MG
100 TABLET ORAL DAILY
Qty: 30 CAPSULE | Refills: 1 | Status: SHIPPED | OUTPATIENT
Start: 2024-02-08

## 2024-02-07 RX ORDER — MAGNESIUM SULFATE IN WATER 40 MG/ML
2000 INJECTION, SOLUTION INTRAVENOUS ONCE
Status: COMPLETED | OUTPATIENT
Start: 2024-02-07 | End: 2024-02-07

## 2024-02-07 RX ORDER — ASPIRIN 81 MG/1
81 TABLET ORAL DAILY
Qty: 30 TABLET | Refills: 3
Start: 2024-02-07

## 2024-02-07 RX ADMIN — ASPIRIN 81 MG: 81 TABLET, COATED ORAL at 08:15

## 2024-02-07 RX ADMIN — DOCUSATE SODIUM 100 MG: 100 CAPSULE, LIQUID FILLED ORAL at 08:15

## 2024-02-07 RX ADMIN — CLOPIDOGREL BISULFATE 75 MG: 75 TABLET ORAL at 08:15

## 2024-02-07 RX ADMIN — MAGNESIUM SULFATE HEPTAHYDRATE 2000 MG: 40 INJECTION, SOLUTION INTRAVENOUS at 10:24

## 2024-02-07 RX ADMIN — LEVOTHYROXINE SODIUM 50 MCG: 0.05 TABLET ORAL at 05:57

## 2024-02-07 RX ADMIN — PIPERACILLIN AND TAZOBACTAM 3375 MG: 3; .375 INJECTION, POWDER, FOR SOLUTION INTRAVENOUS at 04:36

## 2024-02-07 RX ADMIN — ENOXAPARIN SODIUM 40 MG: 100 INJECTION SUBCUTANEOUS at 08:15

## 2024-02-07 NOTE — PROGRESS NOTES
A&Ox4. Denies pain upon rounds. No complaints of /SOB, chest pain or heaviness. No cyanosis or pallor. Able to ambulate to the bathroom with walker. Had 1 bowel movement.

## 2024-02-07 NOTE — PLAN OF CARE
Problem: ABCDS Injury Assessment  Goal: Absence of physical injury  2/7/2024 1130 by Polo Morales, RN  Outcome: Adequate for Discharge     Problem: Safety - Adult  Goal: Free from fall injury  2/7/2024 1130 by Polo Morales, RN  Outcome: Adequate for Discharge     Problem: Pain  Goal: Verbalizes/displays adequate comfort level or baseline comfort level  2/7/2024 1130 by Polo Morales, RN  Outcome: Adequate for Discharge     Problem: Gastrointestinal - Adult  Goal: Minimal or absence of nausea and vomiting  2/7/2024 1130 by Polo Morales, RN  Outcome: Adequate for Discharge     Problem: Gastrointestinal - Adult  Goal: Maintains adequate nutritional intake  2/7/2024 1130 by Polo Morales, RN  Outcome: Adequate for Discharge

## 2024-02-07 NOTE — PLAN OF CARE
Problem: Safety - Adult  Goal: Free from fall injury  Outcome: Progressing     Problem: Pain  Goal: Verbalizes/displays adequate comfort level or baseline comfort level  Outcome: Progressing     Problem: Gastrointestinal - Adult  Goal: Minimal or absence of nausea and vomiting  Outcome: Progressing

## 2024-02-07 NOTE — DISCHARGE INSTR - COC
Continuity of Care Form    Patient Name: Dexter Akers   :  1929  MRN:  [id number]    Admit date:  2/3/2024  Discharge date:  ***    Code Status Order: Prior   Advance Directives:     Admitting Physician:  Demar Snowden MD  PCP: Gricel Petit MD    Discharging Nurse: ***  Discharging Hospital Unit/Room#: 0332/0332-01  Discharging Unit Phone Number: ***    Emergency Contact:   Extended Emergency Contact Information  Primary Emergency Contact: Yanni Wilde  Mobile Phone: [phone]  Relation: Niece/Nephew  Secondary Emergency Contact: AmandaRobyn vidal \"Vy\"  Mobile Phone: [phone]  Relation: Child    Past Surgical History:  No past surgical history on file.    Immunization History:   Immunization History   Administered Date(s) Administered    COVID-19, MODERNA Booster BLUE border, (age 18y+), IM, 50mcg/0.25mL 2022, 2022       Active Problems:  Patient Active Problem List   Diagnosis Code    Gallstone pancreatitis K85.10    Abdominal pain R10.9       Isolation/Infection:   Isolation            No Isolation          Patient Infection Status       None to display            Nurse Assessment:  Last Vital Signs: BP (!) 147/56   Pulse 66   Temp 98 °F (36.7 °C) (Oral)   Resp 18   Ht 1.727 m (5' 8\")   Wt 88.4 kg (194 lb 14.4 oz)   SpO2 95%   BMI 29.63 kg/m²     Last documented pain score (0-10 scale): Pain Level: 0  Last Weight:   Wt Readings from Last 1 Encounters:   24 88.4 kg (194 lb 14.4 oz)     Mental Status:  {IP PT MENTAL STATUS:}    IV Access:  { WAQAS IV ACCESS:172558812}    Nursing Mobility/ADLs:  Walking   {CHP DME ADLs:367790234}  Transfer  {CHP DME ADLs:993621265}  Bathing  {CHP DME ADLs:204532437}  Dressing  {CHP DME ADLs:982251139}  Toileting  {CHP DME ADLs:092294400}  Feeding  {CHP DME ADLs:302485106}  Med Admin  {CHP DME ADLs:629519507}  Med Delivery   { WAQAS MED Delivery:594216647}    Wound Care Documentation and Therapy:  Wound 24 Sternum  Upper (Active)   Wound Image   02/05/24 1740   Wound Etiology Non-Healing Surgical 02/07/24 0818   Dressing Status Other (Comment) 02/07/24 0818   Wound Cleansed Cleansed with saline 02/06/24 1000   Dressing/Treatment Betadine swabs/povidone iodine;Open to air 02/06/24 1000   Dressing Change Due 02/06/24 02/05/24 1740   Wound Length (cm) 3 cm 02/05/24 1740   Wound Width (cm) 1.5 cm 02/05/24 1740   Wound Depth (cm) 0 cm 02/05/24 1740   Wound Surface Area (cm^2) 4.5 cm^2 02/05/24 1740   Wound Volume (cm^3) 0 cm^3 02/05/24 1740   Distance Tunneling (cm) 0 cm 02/05/24 1740   Tunneling Position ___ O'Clock 0 02/05/24 1740   Undermining Starts ___ O'Clock 0 02/05/24 1740   Undermining Ends___ O'Clock 0 02/05/24 1740   Undermining Maxium Distance (cm) 0 02/05/24 1740   Wound Assessment Eschar dry 02/07/24 0818   Drainage Amount None (dry) 02/07/24 0818   Odor None 02/07/24 0818   Christine-wound Assessment Fragile;Intact 02/05/24 2025   Margins Undefined edges 02/05/24 2025   Wound Thickness Description not for Pressure Injury Partial thickness 02/05/24 1740   Number of days: 4        Elimination:  Continence:   Bowel: {YES / NO:19727}  Bladder: {YES / NO:19727}  Urinary Catheter: {Urinary Catheter:467159779}   Colostomy/Ileostomy/Ileal Conduit: {YES / NO:19727}       Date of Last BM: ***  No intake or output data in the 24 hours ending 02/07/24 1845  I/O last 3 completed shifts:  In: 2596 [P.O.:596; I.V.:1773; IV Piggyback:227]  Out: -     Safety Concerns:     { WAQAS Safety Concerns:174391960}    Impairments/Disabilities:      { WAQAS Impairments/Disabilities:436438503}    Nutrition Therapy:  Current Nutrition Therapy:   { WAQAS Diet List:067537565}    Routes of Feeding: {CHP DME Other Feedings:239519936}  Liquids: {Slp liquid thickness:38858}  Daily Fluid Restriction: {CHP DME Yes amt example:436050727}  Last Modified Barium Swallow with Video (Video Swallowing Test): {Done Not Done Date:304088012}    Treatments at the Time

## 2024-02-07 NOTE — PROGRESS NOTES
PROGRESS NOTE    HPI: Dexter Akers is a(n)89 y.o. male admitted for work-up and treatment for Gallstone pancreatitis [K85.10]  Abdominal pain [R10.9].     We are following for gallstone pancreatitis.    Subjective:     No clinical changes.  Continues to feel well.  Tolerating diet.      Objective:     I/O last 3 completed shifts:  In: 2596 [P.O.:596; I.V.:1773; IV Piggyback:227]  Out: -       BP (!) 147/56   Pulse 66   Temp 98 °F (36.7 °C) (Oral)   Resp 18   Ht 1.727 m (5' 8\")   Wt 88.4 kg (194 lb 14.4 oz)   SpO2 95%   BMI 29.63 kg/m²     Physical Exam:  HEENT: non icteric sclera, oropharyngeal membranes pink and moist.  Cor: RRR  Lungs: non-labored, no respiratory distress  Abdomen: distended but soft, NT  Extremities: no edema  Neuro: alert and oriented x 3      Results:   Lab Results   Component Value Date    ALT 65 (H) 02/07/2024    AST 22 02/07/2024    ALKPHOS 247 (H) 02/07/2024    BILIDIR 0.8 (H) 02/07/2024    PROT 6.2 (L) 02/07/2024    LABALBU 3.0 (L) 02/07/2024    LIPASE 210.0 (H) 02/05/2024     Lab Results   Component Value Date    WBC 7.9 02/07/2024    HGB 10.2 (L) 02/07/2024    HCT 30.9 (L) 02/07/2024    MCV 89.0 02/07/2024     02/07/2024     BUN/Cr/glu/ALT/AST/amyl/lip:  19/1.2/--/65/22/--/-- (02/07 0604)  MRI ABDOMEN WO CONTRAST MRCP    Result Date: 2/3/2024  1. Suspected changes of acute cholecystitis. 2. Mild biliary ductal dilatation without obstructing stone or mass seen. 3. 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 Recommend follow-up pancreas protocol MRI in 2 years.     XR ABDOMEN (KUB) (SINGLE AP VIEW)    Result Date: 2/3/2024  Nonobstructive bowel gas pattern.         Impression:  89 year old male with past medical history of hypothyroidism, HTN and CAD s/p LHC with PCI 1/9 presenting with [facility] ER with abdominal pain and nausea.     Obstructive jaundice, RUQ pain. Likely gallstone

## 2024-02-07 NOTE — DISCHARGE SUMMARY
Hospital Medicine Discharge Summary    Patient: Dexter Akers   : 1929     Admit Date: 2/3/2024   Discharge Date: 2024    Disposition:  [x]Home   []HHC  []SNF  []ECF  []Acute Rehab  []LTAC  []Hospice  Code status:  [x]Full  []DNR/CCA  []Limited (DNR/CCA with Do Not Intubate)  []DNRCC  Condition at Discharge: Stable  Primary Care Provider: Gricel Petit MD    Admitting Provider: Demar Snowden MD  Discharge Provider: Demar Snowden MD     Discharge Diagnoses:      Active Hospital Problems    Diagnosis     Gallstone pancreatitis [K85.10]     Abdominal pain [R10.9]        Presenting Admission History:        89 y.o. male with hx of cad, htn, hld, hypothyroid who presented to [facility] with abd pain. Pt developed sudden onset sharp stabbing abd pain yesterday after lunch(he ate gumbo soup).  Pain was 10/10 so came to [facility] for evaluation. He had assoc chills/rigors and nausea but no emesis.  He noted some diarrhea.    -Of note, he had 2 DAYRON placed by cards approx 3 weeks ago and is on asa/plavix.    -Workup was concerning for pancreatitits so pt was transferred to [facility](lack of bed availability).  -pain is 1/10 currently  -he doesn't smoke/drink     Assessment/Plan:        Abd pain- resolved rapidly, concern for possible gallstone pancreatitis, OSH CT a/p with contrast obtained(gb distended, cbd dilated to 1.3cm, mild intrahepatic biliary dilation, inflammatory change of fat around panc head/uncinate process, possible 1.1cm rt adrenal adenoma, 2mm left renal stone-nonobstructing, no sb obstruction, prominent left colonic diverticulosis, dense calcification of abd aorta/bilat renl artery origins, enlarged prostate)  -GI consulted, apprec recs  -MRCP done 2/3(noted possible acute cholecystitis, no stone/mass seen in biliary duct, IPMN of pan body measuring 7mm(low risk, f/u 2 years MRI with pancr. Protocol)  -ivfs given  -kept NPO initially, diet advanced and tolerated low

## 2024-02-07 NOTE — CARE COORDINATION
CASE MANAGEMENT DISCHARGE SUMMARY      Discharge to: home      IMM given: 2/6/24    New Durable Medical Equipment ordered/agency: none    Transportation: private       Confirmed discharge plan with:     Patient: yes     Family:  yes patient to call grandson to .        RN, name: Polo Kwan RN        
Chart reviewed day 3. Care managed by nephrology, GI and IM. Renal US complete. Spoke with patient stated feeling better. GI with recs to advance diet continue atbx x7 days, follow LFT's. IPTA from home with grandson. Denied any DCP needs. Sarah Kwan RN    
   Potential DME:    Patient expects to discharge to: House  Plan for transportation at discharge:      Financial    Payor: MEDICARE / Plan: MEDICARE PART A AND B / Product Type: *No Product type* /     Does insurance require precert for SNF: No    Potential assistance Purchasing Medications: No  Meds-to-Beds request: Yes      [facility] - [place] - [place], [place] - [address] - P [phone] - F [phone]  [address] [address]  Phone: [phone] Fax: [phone]    [facility] - [place], [place] - [address] - P [phone] - F [phone]  [address]  [facility] [address]  Phone: [phone] Fax: [phone]      Notes:    Factors facilitating achievement of predicted outcomes: Family support, Motivated, Cooperative, Pleasant, Sense of humor, Good insight into deficits, and Has needed Durable Medical Equipment at home    Barriers to discharge: none    Additional Case Management Notes: spoke with patient. Reported lives in basement apartment of home he owns. His grandson lives on main level. He has access to outside from basement. Reported uses no DME although he has a lot from wife who passed 6 yrs ago. He still drives and works. Currently declining any needs at d/c. May need EUS per notes, that service is not available at this facility. Sarah Kwan RN      The Plan for Transition of Care is related to the following treatment goals of Gallstone pancreatitis [K85.10]  Abdominal pain [R10.9]    IF APPLICABLE: The Patient and/or patient representative Dexter and his family were provided with a choice of provider and agrees with the discharge plan. Freedom of choice list with basic dialogue that supports the patient's individualized plan of care/goals and shares the quality data associated with the providers was provided to:     Patient Representative Name:       The Patient and/or Patient Representative Agree with the Discharge Plan?      Sarah

## 2024-02-07 NOTE — PROGRESS NOTES
Department of Internal Medicine  Nephrology Progress Note        SUBJECTIVE:    We are following this patient for BUTCH.  The patient was seen and examined; he feels well today with no CP, SOB, nausea or vomiting.    ROS: No fever or chills.  Social: No family at bedside.    Physical Exam:    VITALS:  BP (!) 147/56   Pulse 66   Temp 98 °F (36.7 °C) (Oral)   Resp 18   Ht 1.727 m (5' 8\")   Wt 88.4 kg (194 lb 14.4 oz)   SpO2 95%   BMI 29.63 kg/m²     General appearance: Seems comfortable, no acute distress.  Neck: Trachea midline, thyroid normal.   Lungs:  Non labored breathing, CTA to anterior auscultation.  Heart:  S1S2 normal, rub or gallop. No peripheral edema.  Abdomen: Soft, non-tender, no organomegaly.   Skin: No lesions or rashes, warm to touch.     DATA:    CBC with Differential:    Lab Results   Component Value Date/Time    WBC 9.0 02/06/2024 06:26 AM    RBC 3.10 02/06/2024 06:26 AM    HGB 9.2 02/06/2024 06:26 AM    HCT 27.3 02/06/2024 06:26 AM     02/06/2024 06:26 AM    MCV 88.2 02/06/2024 06:26 AM    MCH 29.6 02/06/2024 06:26 AM    MCHC 33.6 02/06/2024 06:26 AM    RDW 13.9 02/06/2024 06:26 AM    BANDSPCT 32 02/03/2024 01:16 PM    LYMPHOPCT 6.0 02/03/2024 01:16 PM    MONOPCT 5.0 02/03/2024 01:16 PM    BASOPCT 0.0 02/03/2024 01:16 PM    MONOSABS 0.8 02/03/2024 01:16 PM    LYMPHSABS 1.3 02/03/2024 01:16 PM    EOSABS 0.0 02/03/2024 01:16 PM    BASOSABS 0.0 02/03/2024 01:16 PM     BMP:    Lab Results   Component Value Date/Time     02/07/2024 06:04 AM    K 4.1 02/07/2024 06:04 AM    K 3.7 02/04/2024 05:42 AM     02/07/2024 06:04 AM    CO2 27 02/07/2024 06:04 AM    BUN 19 02/07/2024 06:04 AM    LABALBU 3.0 02/07/2024 06:04 AM    CREATININE 1.2 02/07/2024 06:04 AM    CALCIUM 8.9 02/07/2024 06:04 AM    LABGLOM 56 02/07/2024 06:04 AM    GLUCOSE 146 02/07/2024 06:04 AM       IMPRESSION/RECOMMENDATIONS:      1- BUTCH: Likely secondary to a pre-renal component along with recent IV contrast